# Patient Record
Sex: FEMALE | Race: WHITE | Employment: OTHER | URBAN - METROPOLITAN AREA
[De-identification: names, ages, dates, MRNs, and addresses within clinical notes are randomized per-mention and may not be internally consistent; named-entity substitution may affect disease eponyms.]

---

## 2020-07-08 ENCOUNTER — TELEPHONE (OUTPATIENT)
Dept: ADMINISTRATIVE | Age: 50
End: 2020-07-08

## 2020-07-08 NOTE — TELEPHONE ENCOUNTER
Patient is moving to the area from [place]. She would like to establish care with Dr. Leah eMnard. She would also like to know if  prescribes non-scheduled psychiatric medications.       Please advise,    Thank you

## 2020-07-10 NOTE — TELEPHONE ENCOUNTER
LVM to call back, Dr. Soha Alvarez approved as long as [facility] meds are not scheduled.

## 2020-08-03 RX ORDER — METHYLPREDNISOLONE 4 MG/1
TABLET ORAL
COMMUNITY
Start: 2020-06-11 | End: 2020-08-06

## 2020-08-03 RX ORDER — ONDANSETRON 4 MG/1
TABLET, FILM COATED ORAL
COMMUNITY
Start: 2020-05-11 | End: 2020-08-06

## 2020-08-03 RX ORDER — LEVOFLOXACIN 500 MG/1
TABLET, FILM COATED ORAL
COMMUNITY
Start: 2020-06-02 | End: 2020-08-06 | Stop reason: SINTOL

## 2020-08-03 RX ORDER — FLUCONAZOLE 150 MG/1
TABLET ORAL
COMMUNITY
Start: 2020-06-02 | End: 2020-08-06

## 2020-08-06 ENCOUNTER — OFFICE VISIT (OUTPATIENT)
Dept: FAMILY MEDICINE CLINIC | Age: 50
End: 2020-08-06
Payer: COMMERCIAL

## 2020-08-06 VITALS
HEART RATE: 88 BPM | HEIGHT: 63 IN | WEIGHT: 190.4 LBS | DIASTOLIC BLOOD PRESSURE: 80 MMHG | TEMPERATURE: 97.9 F | BODY MASS INDEX: 33.73 KG/M2 | OXYGEN SATURATION: 98 % | SYSTOLIC BLOOD PRESSURE: 130 MMHG

## 2020-08-06 PROCEDURE — 99396 PREV VISIT EST AGE 40-64: CPT | Performed by: FAMILY MEDICINE

## 2020-08-06 RX ORDER — NICOTINE POLACRILEX 2 MG
GUM BUCCAL
COMMUNITY

## 2020-08-06 RX ORDER — BUPROPION HYDROCHLORIDE 200 MG/1
200 TABLET, EXTENDED RELEASE ORAL 2 TIMES DAILY
Qty: 180 TABLET | Refills: 0 | Status: SHIPPED | OUTPATIENT
Start: 2020-08-06

## 2020-08-06 RX ORDER — TRETINOIN 0.5 MG/G
CREAM TOPICAL
Qty: 45 G | Refills: 0 | Status: SHIPPED | OUTPATIENT
Start: 2020-08-06 | End: 2020-09-05

## 2020-08-06 RX ORDER — LAMOTRIGINE 150 MG/1
150 TABLET ORAL DAILY
COMMUNITY
End: 2020-08-06 | Stop reason: SDUPTHER

## 2020-08-06 RX ORDER — BUPROPION HYDROCHLORIDE 200 MG/1
200 TABLET, EXTENDED RELEASE ORAL 2 TIMES DAILY
COMMUNITY
End: 2020-08-06 | Stop reason: SDUPTHER

## 2020-08-06 RX ORDER — TIZANIDINE 4 MG/1
4 TABLET ORAL EVERY 6 HOURS PRN
COMMUNITY

## 2020-08-06 RX ORDER — IBUPROFEN AND FAMOTIDINE 26.6; 8 MG/1; MG/1
TABLET, FILM COATED ORAL
COMMUNITY

## 2020-08-06 RX ORDER — BUTALBITAL, ACETAMINOPHEN AND CAFFEINE 300; 40; 50 MG/1; MG/1; MG/1
1 CAPSULE ORAL EVERY 4 HOURS PRN
COMMUNITY

## 2020-08-06 RX ORDER — LANOLIN ALCOHOL/MO/W.PET/CERES
3 CREAM (GRAM) TOPICAL DAILY
COMMUNITY

## 2020-08-06 RX ORDER — LAMOTRIGINE 150 MG/1
150 TABLET ORAL DAILY
Qty: 90 TABLET | Refills: 0 | Status: SHIPPED | OUTPATIENT
Start: 2020-08-06

## 2020-08-06 SDOH — HEALTH STABILITY: MENTAL HEALTH: HOW MANY STANDARD DRINKS CONTAINING ALCOHOL DO YOU HAVE ON A TYPICAL DAY?: 1 OR 2

## 2020-08-06 SDOH — HEALTH STABILITY: MENTAL HEALTH: HOW OFTEN DO YOU HAVE A DRINK CONTAINING ALCOHOL?: 2-4 TIMES A MONTH

## 2020-08-06 ASSESSMENT — ENCOUNTER SYMPTOMS
VOMITING: 0
COUGH: 0
DIARRHEA: 0
SINUS PAIN: 0
WHEEZING: 0
BACK PAIN: 1
SORE THROAT: 0
CHEST TIGHTNESS: 0
TROUBLE SWALLOWING: 0
SHORTNESS OF BREATH: 0
NAUSEA: 0
ABDOMINAL PAIN: 0
CONSTIPATION: 0
EYE PAIN: 0

## 2020-08-06 ASSESSMENT — PATIENT HEALTH QUESTIONNAIRE - PHQ9
SUM OF ALL RESPONSES TO PHQ QUESTIONS 1-9: 2
SUM OF ALL RESPONSES TO PHQ9 QUESTIONS 1 & 2: 2
2. FEELING DOWN, DEPRESSED OR HOPELESS: 1
SUM OF ALL RESPONSES TO PHQ QUESTIONS 1-9: 2
1. LITTLE INTEREST OR PLEASURE IN DOING THINGS: 1

## 2020-08-06 NOTE — PROGRESS NOTES
20    Name: Cullen Alvarez  :1970   Sex:female   Age:49 y.o. Chief Complaint   Patient presents with   [facility].     Patient presents to office to establish. She is a native of [place], but recently moved to the area from [place]. 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 Patient has Bipolar Disorder and chronic back pain. She sees pain specialist Leyda Mills for her controlled medications. Patient has been having migraines daily, she says she is due for trigger point injections from pain management. Patient does not see psych or counselor. She says her insurance does not cover mental health. Patient is  and has 2 children. She has been under a lot of stress lately that has caused her to feel more depressed. She says that her dad has cancer and her 2 sisters are not helping to take care of him even though one sibling lives with him. Her mom passed away about 15 years ago. Patient here to establish  Past medical and surgical history reviewed    Will need to get establish with therapist  Jm Araiza for bipolar disorder    Sees pain management for chronic pain and migraine/chronic headaches    Mammograms per her GYN  Mother had breast cancer at at least age 28    Needs blood work done last labs seem to be 3/2018        Review of Systems   Constitutional: Negative for appetite change, fatigue and fever. HENT: Negative for congestion, ear pain, sinus pain, sore throat and trouble swallowing. Eyes: Negative for pain. Respiratory: Negative for cough, chest tightness, shortness of breath and wheezing. Cardiovascular: Negative for chest pain, palpitations and leg swelling. Gastrointestinal: Negative for abdominal pain, constipation, diarrhea, nausea and vomiting. Endocrine: Negative for cold intolerance and heat intolerance. Genitourinary: Negative for difficulty urinating, dysuria, frequency, hematuria, pelvic pain and urgency.    Musculoskeletal: Positive for arthralgias, back pain and myalgias. Negative for gait problem and joint swelling. Skin: Negative for rash and wound. Neurological: Positive for numbness and headaches. Negative for dizziness, syncope and light-headedness. Hematological: Negative for adenopathy. Psychiatric/Behavioral: Positive for dysphoric mood. Negative for confusion, self-injury, sleep disturbance and suicidal ideas. The patient is nervous/anxious. Current Outpatient Medications:     tapentadol (NUCYNTA) 100 MG TABS, Take 200 mg by mouth daily as needed. , Disp: , Rfl:     tiZANidine (ZANAFLEX) 4 MG tablet, Take 4 mg by mouth every 6 hours as needed, Disp: , Rfl:     butalbital-APAP-caffeine -40 MG CAPS per capsule, Take 1 capsule by mouth every 4 hours as needed, Disp: , Rfl:     ibuprofen-famotidine 800-26.6 MG TABS, Take by mouth, Disp: , Rfl:     Biotin 1 MG CAPS, Take by mouth, Disp: , Rfl:     melatonin 3 MG TABS tablet, Take 3 mg by mouth daily, Disp: , Rfl:     buPROPion (WELLBUTRIN SR) 200 MG extended release tablet, Take 1 tablet by mouth 2 times daily, Disp: 180 tablet, Rfl: 0    lamoTRIgine (LAMICTAL) 150 MG tablet, Take 1 tablet by mouth daily, Disp: 90 tablet, Rfl: 0    tretinoin (RETIN-A) 0.05 % cream, Apply topically nightly., Disp: 45 g, Rfl: 0  Allergies   Allergen Reactions    Levaquin [Levofloxacin]     Vancomycin Anaphylaxis      Past Medical History:   Diagnosis Date    Anxiety     B[facility] disorder ([facility] [address]     Headache     due to arthritis in neck    Osteogenesis imperfecta     Seasonal affective disorder (HCC)      There are no active problems to display for this patient.      Past Surgical History:   Procedure Laterality Date    ABDOMINOPLASTY      APPENDECTOMY      CERVICAL DISCECTOMY      GASTRIC BYPASS SURGERY  2002    [facility] [place]      x 2-- [address] [place] [address]  2011    THORACIC LAMINECTOMY        Social History To Psychology    Chronic intractable headache, unspecified headache type  Comments:  sees painmanagment in [place] b/c they are due to neck arthritis    Osteogenesis imperfecta  Comments:  sees pain managmenet in [place]    Other orders  -     buPROPion (WELLBUTRIN SR) 200 MG extended release tablet; Take 1 tablet by mouth 2 times daily  -     lamoTRIgine (LAMICTAL) 150 MG tablet; Take 1 tablet by mouth daily  -     tretinoin (RETIN-A) 0.05 % cream; Apply topically nightly. refer to 2017 [facility] labs done before 3 month follow up  Sees DR Garland for mammograms and paps      I independently reviewed and updated the chief complaint, HPI, past medical and surgical history, medications, allergies and ROS as entered by the LPN. Seen by:   Nura Mary DO

## 2020-08-10 ENCOUNTER — TELEPHONE (OUTPATIENT)
Dept: ADMINISTRATIVE | Age: 50
End: 2020-08-10

## 2020-08-11 ENCOUNTER — TELEPHONE (OUTPATIENT)
Dept: FAMILY MEDICINE CLINIC | Age: 50
End: 2020-08-11